# Patient Record
Sex: MALE | Race: WHITE | Employment: FULL TIME | URBAN - METROPOLITAN AREA
[De-identification: names, ages, dates, MRNs, and addresses within clinical notes are randomized per-mention and may not be internally consistent; named-entity substitution may affect disease eponyms.]

---

## 2023-07-18 ENCOUNTER — OFFICE VISIT (OUTPATIENT)
Dept: PRIMARY CARE | Facility: CLINIC | Age: 33
End: 2023-07-18
Payer: COMMERCIAL

## 2023-07-18 VITALS
HEART RATE: 73 BPM | OXYGEN SATURATION: 100 % | BODY MASS INDEX: 27.87 KG/M2 | SYSTOLIC BLOOD PRESSURE: 104 MMHG | TEMPERATURE: 98 F | DIASTOLIC BLOOD PRESSURE: 72 MMHG | WEIGHT: 173.4 LBS | HEIGHT: 66 IN | RESPIRATION RATE: 16 BRPM

## 2023-07-18 DIAGNOSIS — Z13.220 SCREENING CHOLESTEROL LEVEL: ICD-10-CM

## 2023-07-18 DIAGNOSIS — L30.9 ECZEMA, UNSPECIFIED TYPE: ICD-10-CM

## 2023-07-18 DIAGNOSIS — Z00.00 ROUTINE GENERAL MEDICAL EXAMINATION AT A HEALTH CARE FACILITY: Primary | ICD-10-CM

## 2023-07-18 PROCEDURE — 1036F TOBACCO NON-USER: CPT | Performed by: FAMILY MEDICINE

## 2023-07-18 PROCEDURE — 99385 PREV VISIT NEW AGE 18-39: CPT | Performed by: FAMILY MEDICINE

## 2023-07-18 RX ORDER — TRIAMCINOLONE ACETONIDE 1 MG/G
CREAM TOPICAL 2 TIMES DAILY
Qty: 30 G | Refills: 1 | Status: SHIPPED | OUTPATIENT
Start: 2023-07-18

## 2023-07-18 RX ORDER — MINERAL OIL
180 ENEMA (ML) RECTAL DAILY
COMMUNITY

## 2023-07-18 NOTE — PROGRESS NOTES
"Subjective   Patient ID: Kurtis Coleman is a 32 y.o. male who presents for Establish Care, Annual Exam, and Rash.    HPI    Patient presents today to [facility]. He is here today for a general check up.  Has not been to a doctor in 3-4 years. Last doctor was Dr. Otero in [place], [place].  He is from [place]. He recently moved back to the area.  Denies any chest pain or SOB. Denies any bowel or urinary issues. Last dental appointment was a few months ago. Does not see eye doctor.     He admits to random rashes. These occur on different areas on his bodies, generally on inner thighs but can be on arms and abdomen as well. He did a VV with a dermatologist last year. He was recommended to change detergents and soaps.  He was also prescribed an ointment, Triamcinolone ointment, which helps sometimes. Hew did try OTC Cortison and jock itch antifungals. The rash is itchy, only gets pain if he scratches too much.    Taking current medications which were reviewed.  Problem list discussed.    Overall doing well.  Eating okay.  Staying active.    Has no other new problem /question.      ROS  Constitutional- No activity change. No appetite change.  Eyes- Denies vision changes.  Respiratory- No shortness of breath.  Cardiovascular- No palpitations. No chest pain.  GI- No nausea or vomiting. No diarrhea or constipation. Denies abdominal pain.  Musculoskeletal- Denies joint swelling.  Extremities- No edema.  Neurological- Denies headaches. Denies dizziness.  Psychiatric/Behavioral- Denies significant anxiety, or depressed mood.     Objective     /72 (BP Location: Left arm, Patient Position: Sitting, BP Cuff Size: Adult)   Pulse 73   Temp 36.7 °C (98 °F) (Temporal)   Resp 16   Ht 1.676 m (5' 6\")   Wt 78.7 kg (173 lb 6.4 oz)   SpO2 100%   BMI 27.99 kg/m²     No Known Allergies    Constitutional-- Well-nourished.  No distress  Head- unremarkable.  Ears- TMs clear.  Eyes- PERRL.  Conjunctiva normal.  Nose- Normal.  No " rhinorrhea noted.  Throat- Oropharynx is clear and moist.  Neck- Supple with no thyromegaly.  No significant cervical adenopathy noted.  Pulmonary/Chest- Breath sounds normal with normal effort.  No wheezing.  Heart- Regular rate and rhythm.  No murmur.  Abdomen- Soft and non-tender.  No masses noted.  Musculoskeletal- Normal ROM.  No significant joint swelling  Extremities- No edema.   Neurological- Alert.  No noted deficits.  Skin- Warm.  Minimal dry pink patches in above described area consistent with eczema  Psychiatric/Behavioral- Mood and affect normal.  Behavior normal.     Assessment/Plan   1. Routine general medical examination at a health care facility  CBC and Auto Differential    Comprehensive Metabolic Panel    Lipid Panel      2. Screening cholesterol level  Lipid Panel      3. Eczema, unspecified type  triamcinolone (Kenalog) 0.1 % cream             Long talk. Treatment options reviewed.  Start triamcinolone cream to be used as directed as needed  Continue and take your medications as prescribed.    Health Maintenance issues discussed.    Importance of healthy diet and regular exercise regimen discussed.    We will contact you with any test results ordered. If you do not hear from us, please contact.    Follow-up as instructed or sooner if any problems or symptoms do not resolve as expected.

## 2023-07-21 ENCOUNTER — LAB (OUTPATIENT)
Dept: LAB | Facility: LAB | Age: 33
End: 2023-07-21
Payer: COMMERCIAL

## 2023-07-21 DIAGNOSIS — Z00.00 ROUTINE GENERAL MEDICAL EXAMINATION AT A HEALTH CARE FACILITY: ICD-10-CM

## 2023-07-21 DIAGNOSIS — Z13.220 SCREENING CHOLESTEROL LEVEL: ICD-10-CM

## 2023-07-21 LAB
ALANINE AMINOTRANSFERASE (SGPT) (U/L) IN SER/PLAS: 25 U/L (ref 10–52)
ALBUMIN (G/DL) IN SER/PLAS: 4.7 G/DL (ref 3.4–5)
ALKALINE PHOSPHATASE (U/L) IN SER/PLAS: 57 U/L (ref 33–120)
ANION GAP IN SER/PLAS: 11 MMOL/L (ref 10–20)
ASPARTATE AMINOTRANSFERASE (SGOT) (U/L) IN SER/PLAS: 19 U/L (ref 9–39)
BASOPHILS (10*3/UL) IN BLOOD BY AUTOMATED COUNT: 0.02 X10E9/L (ref 0–0.1)
BASOPHILS/100 LEUKOCYTES IN BLOOD BY AUTOMATED COUNT: 0.5 % (ref 0–2)
BILIRUBIN TOTAL (MG/DL) IN SER/PLAS: 1.1 MG/DL (ref 0–1.2)
CALCIUM (MG/DL) IN SER/PLAS: 10 MG/DL (ref 8.6–10.3)
CARBON DIOXIDE, TOTAL (MMOL/L) IN SER/PLAS: 28 MMOL/L (ref 21–32)
CHLORIDE (MMOL/L) IN SER/PLAS: 104 MMOL/L (ref 98–107)
CHOLESTEROL (MG/DL) IN SER/PLAS: 203 MG/DL (ref 0–199)
CHOLESTEROL IN HDL (MG/DL) IN SER/PLAS: 63.7 MG/DL
CHOLESTEROL/HDL RATIO: 3.2
CREATININE (MG/DL) IN SER/PLAS: 1.16 MG/DL (ref 0.5–1.3)
EOSINOPHILS (10*3/UL) IN BLOOD BY AUTOMATED COUNT: 0.04 X10E9/L (ref 0–0.7)
EOSINOPHILS/100 LEUKOCYTES IN BLOOD BY AUTOMATED COUNT: 1.1 % (ref 0–6)
ERYTHROCYTE DISTRIBUTION WIDTH (RATIO) BY AUTOMATED COUNT: 12.6 % (ref 11.5–14.5)
ERYTHROCYTE MEAN CORPUSCULAR HEMOGLOBIN CONCENTRATION (G/DL) BY AUTOMATED: 33.3 G/DL (ref 32–36)
ERYTHROCYTE MEAN CORPUSCULAR VOLUME (FL) BY AUTOMATED COUNT: 93 FL (ref 80–100)
ERYTHROCYTES (10*6/UL) IN BLOOD BY AUTOMATED COUNT: 4.7 X10E12/L (ref 4.5–5.9)
GFR MALE: 85 ML/MIN/1.73M2
GLUCOSE (MG/DL) IN SER/PLAS: 88 MG/DL (ref 74–99)
HEMATOCRIT (%) IN BLOOD BY AUTOMATED COUNT: 43.9 % (ref 41–52)
HEMOGLOBIN (G/DL) IN BLOOD: 14.6 G/DL (ref 13.5–17.5)
IMMATURE GRANULOCYTES/100 LEUKOCYTES IN BLOOD BY AUTOMATED COUNT: 0 % (ref 0–0.9)
LDL: 127 MG/DL (ref 0–99)
LEUKOCYTES (10*3/UL) IN BLOOD BY AUTOMATED COUNT: 3.8 X10E9/L (ref 4.4–11.3)
LYMPHOCYTES (10*3/UL) IN BLOOD BY AUTOMATED COUNT: 1.6 X10E9/L (ref 1.2–4.8)
LYMPHOCYTES/100 LEUKOCYTES IN BLOOD BY AUTOMATED COUNT: 42.2 % (ref 13–44)
MONOCYTES (10*3/UL) IN BLOOD BY AUTOMATED COUNT: 0.5 X10E9/L (ref 0.1–1)
MONOCYTES/100 LEUKOCYTES IN BLOOD BY AUTOMATED COUNT: 13.2 % (ref 2–10)
NEUTROPHILS (10*3/UL) IN BLOOD BY AUTOMATED COUNT: 1.63 X10E9/L (ref 1.2–7.7)
NEUTROPHILS/100 LEUKOCYTES IN BLOOD BY AUTOMATED COUNT: 43 % (ref 40–80)
PLATELETS (10*3/UL) IN BLOOD AUTOMATED COUNT: 197 X10E9/L (ref 150–450)
POTASSIUM (MMOL/L) IN SER/PLAS: 4 MMOL/L (ref 3.5–5.3)
PROTEIN TOTAL: 7.7 G/DL (ref 6.4–8.2)
SODIUM (MMOL/L) IN SER/PLAS: 139 MMOL/L (ref 136–145)
TRIGLYCERIDE (MG/DL) IN SER/PLAS: 63 MG/DL (ref 0–149)
UREA NITROGEN (MG/DL) IN SER/PLAS: 20 MG/DL (ref 6–23)
VLDL: 13 MG/DL (ref 0–40)

## 2023-07-21 PROCEDURE — 80053 COMPREHEN METABOLIC PANEL: CPT

## 2023-07-21 PROCEDURE — 85025 COMPLETE CBC W/AUTO DIFF WBC: CPT

## 2023-07-21 PROCEDURE — 36415 COLL VENOUS BLD VENIPUNCTURE: CPT

## 2023-07-21 PROCEDURE — 80061 LIPID PANEL: CPT

## 2023-11-28 ENCOUNTER — APPOINTMENT (OUTPATIENT)
Dept: ENDOCRINOLOGY | Facility: CLINIC | Age: 33
End: 2023-11-28
Payer: COMMERCIAL

## 2025-02-24 ASSESSMENT — ENCOUNTER SYMPTOMS
FEVER: 0
SORE THROAT: 0
HEADACHES: 1
MYALGIAS: 0
HEARTBURN: 0
HEMOPTYSIS: 0
WHEEZING: 0
WEIGHT LOSS: 0
RHINORRHEA: 0
SWEATS: 0
COUGH: 1
SHORTNESS OF BREATH: 0
CHILLS: 0

## 2025-02-25 ENCOUNTER — OFFICE VISIT (OUTPATIENT)
Dept: PRIMARY CARE | Facility: CLINIC | Age: 35
End: 2025-02-25
Payer: COMMERCIAL

## 2025-02-25 VITALS
SYSTOLIC BLOOD PRESSURE: 126 MMHG | BODY MASS INDEX: 28.9 KG/M2 | WEIGHT: 179.8 LBS | HEIGHT: 66 IN | TEMPERATURE: 97.7 F | HEART RATE: 96 BPM | DIASTOLIC BLOOD PRESSURE: 70 MMHG | OXYGEN SATURATION: 97 %

## 2025-02-25 DIAGNOSIS — Z00.00 HEALTHCARE MAINTENANCE: ICD-10-CM

## 2025-02-25 DIAGNOSIS — R05.9 COUGH, UNSPECIFIED TYPE: ICD-10-CM

## 2025-02-25 DIAGNOSIS — J40 BRONCHITIS: Primary | ICD-10-CM

## 2025-02-25 PROCEDURE — 99213 OFFICE O/P EST LOW 20 MIN: CPT | Performed by: FAMILY MEDICINE

## 2025-02-25 PROCEDURE — 3008F BODY MASS INDEX DOCD: CPT | Performed by: FAMILY MEDICINE

## 2025-02-25 RX ORDER — METHYLPREDNISOLONE 4 MG/1
TABLET ORAL
Qty: 21 TABLET | Refills: 0 | Status: SHIPPED | OUTPATIENT
Start: 2025-02-25

## 2025-02-25 RX ORDER — CEFUROXIME AXETIL 250 MG/1
250 TABLET ORAL 2 TIMES DAILY
Qty: 20 TABLET | Refills: 0 | Status: SHIPPED | OUTPATIENT
Start: 2025-02-25 | End: 2025-03-07

## 2025-02-25 NOTE — PROGRESS NOTES
"Subjective   Patient ID: Kurtis Coleman is a 34 y.o. male who presents for Cough.  HPI    Patient presents in the office today with complaints of a cough. Patient states that this is a dry cough. Patient states he was ill and this cough is now lingering. Ongoing X 5 weeks. Has tried Nyquil, Dayquil, and Mucinex. [facility] and he was prescribed a Zpak, Prednisone's, and Tessalon pearls. He was told that this was bronchitis by the clinic. Patient states this is keeping him awake at night.      Taking current medications which were reviewed.  Problem list discussed.    Eating okay.  Staying active.    Has no other new problem /question.      ROS  Constitutional- No activity change. No appetite change.  Eyes- Denies vision changes.  Respiratory- cough  Cardiovascular- No palpitations. No chest pain.  GI- No nausea or vomiting. No diarrhea or constipation. Denies abdominal pain.  Musculoskeletal- Denies joint swelling.  Neurological- Denies headaches. Denies dizziness.  Skin- No rashes.    Objective     /70 (BP Location: Left arm, Patient Position: Sitting)   Pulse 96   Temp 36.5 °C (97.7 °F) (Temporal)   Ht 1.676 m (5' 6\")   Wt 81.6 kg (179 lb 12.8 oz)   SpO2 97%   BMI 29.02 kg/m²     No Known Allergies    Constitutional-- Well-nourished.  No distress  Head- unremarkable.  Ears- TMs clear.  Eyes- PERRL.  Conjunctiva normal.  Nose- significant for clear rhinorrhea and mild bilateral maxillary sinus pressure  Throat- Oropharynx is mildly inflamed  Neck- Supple with no thyromegaly.  Mild anterior cervical adenopathy noted.  Pulmonary/Chest- Breath sounds with normal effort.  Trace upper airway rhonchi  Heart- Regular rate and rhythm.  No murmur.  Abdomen- Soft and non-tender.  No masses noted.  Extremities- no edema.    Assessment/Plan   1. Bronchitis  cefuroxime (Ceftin) 250 mg tablet    methylPREDNISolone (Medrol Dospak) 4 mg tablets      2. Cough, unspecified type   "      3. Healthcare maintenance        4. BMI 29.0-29.9,adult               Long talk. Treatment options reviewed.    Reviewed most recent lab work with patient. Advised patient to remain up to date on routine maintenance and health screening.      Discussed Bronchitis. Discussed related symptoms. Take Ceftin and Methylprednisolone as discussed.  Over-the-counter cough and cold medicine as needed for symptom relief continue to monitor.     Continue and take your medications as prescribed.    Health Maintenance issues discussed.    Importance of healthy diet and regular exercise regimen discussed.      Follow-up as instructed or sooner if any problems or symptoms do not resolve as expected.      Scribe Attestation  By signing my name below, I, Cheo Cárdenas   attest that this documentation has been prepared under the direction and in the presence of Naresh Rodriguez MD.